# Patient Record
Sex: MALE | Race: WHITE | NOT HISPANIC OR LATINO
[De-identification: names, ages, dates, MRNs, and addresses within clinical notes are randomized per-mention and may not be internally consistent; named-entity substitution may affect disease eponyms.]

---

## 2018-10-05 ENCOUNTER — TRANSCRIPTION ENCOUNTER (OUTPATIENT)
Age: 43
End: 2018-10-05

## 2022-09-20 PROBLEM — Z00.00 ENCOUNTER FOR PREVENTIVE HEALTH EXAMINATION: Status: ACTIVE | Noted: 2022-09-20

## 2022-09-22 ENCOUNTER — APPOINTMENT (OUTPATIENT)
Dept: ORTHOPEDIC SURGERY | Facility: CLINIC | Age: 47
End: 2022-09-22

## 2022-09-22 VITALS — HEIGHT: 74 IN | BODY MASS INDEX: 25.67 KG/M2 | WEIGHT: 200 LBS

## 2022-09-22 DIAGNOSIS — S86.012A STRAIN OF LEFT ACHILLES TENDON, INITIAL ENCOUNTER: ICD-10-CM

## 2022-09-22 PROCEDURE — 99203 OFFICE O/P NEW LOW 30 MIN: CPT

## 2022-09-22 PROCEDURE — L4361: CPT | Mod: LT

## 2022-09-22 NOTE — DATA REVIEWED
[Ultrasound] : ultrasound [Left] : left [Lower Extremities] : lower extremities [Report was reviewed and noted in the chart] : The report was reviewed and noted in the chart [FreeTextEntry1] : partial achilles tear

## 2022-09-22 NOTE — HISTORY OF PRESENT ILLNESS
[de-identified] : 09/22/2022:  Achilles tendon injury playing basketball  in July 2022.  was told had achilles tear and placed in cast for 2.5 months in [place]. just had cast removed and returned and is looking to start PT. no prior ankle probs. no dm/tob. self employed - marketing.  [FreeTextEntry1] : left achilles

## 2022-09-22 NOTE — PHYSICAL EXAM
[Left] : left foot and ankle [Mild] : mild diffused ankle swelling [5___] : [facility] 5[unfilled]/5 [2+] : dorsalis pedis pulse: 2+ [] : Sensation present to light touch in all distributions [de-identified] : abnormal woodward [de-identified] : using i walk [de-identified] : plantar flexion 20 degrees [TWNoteComboBox7] : dorsiflexion 5 degrees

## 2022-10-27 ENCOUNTER — APPOINTMENT (OUTPATIENT)
Dept: ORTHOPEDIC SURGERY | Facility: CLINIC | Age: 47
End: 2022-10-27

## 2022-10-27 PROCEDURE — L4350: CPT | Mod: LT

## 2022-10-27 PROCEDURE — 99213 OFFICE O/P EST LOW 20 MIN: CPT | Mod: 25

## 2022-10-27 NOTE — PHYSICAL EXAM
[Left] : left foot and ankle [2+] : dorsalis pedis pulse: 2+ [5___] : plantar flexion 5[unfilled]/5 [] : patient ambulates without assistive device [de-identified] : abnormal woodward [de-identified] : using i walk [de-identified] : plantar flexion 30 degrees [TWNoteComboBox7] : dorsiflexion 10 degrees

## 2022-10-27 NOTE — ASSESSMENT
[FreeTextEntry1] : wbat  -- transition to airsport\par PT\par nsaids prn\par f/up 6 wks\par no jumping/cutting
Statement Selected

## 2022-10-27 NOTE — HISTORY OF PRESENT ILLNESS
[1] : 2 [0] : 0 [de-identified] : 09/22/2022:  Achilles tendon injury playing basketball  in July 2022.  was told had achilles tear and placed in cast for 2.5 months in [place]. just had cast removed and returned and is looking to start PT. no prior ankle probs. 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 going to PT. going w/o boot at times. no new injury [FreeTextEntry1] : left achilles

## 2022-12-07 ENCOUNTER — APPOINTMENT (OUTPATIENT)
Dept: ORTHOPEDIC SURGERY | Facility: CLINIC | Age: 47
End: 2022-12-07

## 2022-12-07 VITALS — WEIGHT: 200 LBS | HEIGHT: 74 IN | BODY MASS INDEX: 25.67 KG/M2

## 2022-12-07 DIAGNOSIS — M25.672 STIFFNESS OF LEFT ANKLE, NOT ELSEWHERE CLASSIFIED: ICD-10-CM

## 2022-12-07 PROCEDURE — L4397: CPT | Mod: LT

## 2022-12-07 PROCEDURE — 99213 OFFICE O/P EST LOW 20 MIN: CPT

## 2022-12-07 NOTE — HISTORY OF PRESENT ILLNESS
[0] : 0 [de-identified] : 09/22/2022:  Achilles tendon injury playing basketball  in July 2022.  was told had achilles tear and placed in cast for 2.5 months in [place]. just had cast removed and returned and is looking to start PT. no prior ankle probs. 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 going to PT. going w/o boot at times. no new injury.\par \par 12/07/2022: no sig pain. using brace. going to PT [] : Post Surgical Visit: no [FreeTextEntry1] : left achilles

## 2022-12-07 NOTE — PHYSICAL EXAM
[Left] : left foot and ankle [5___] : [facility] 5[unfilled]/5 [2+] : dorsalis pedis pulse: 2+ [] : no achilles tendon tenderness [NL (40)] : plantar flexion 40 degrees [de-identified] : abnormal woodward [de-identified] : plantar flexion 30 degrees [TWNoteComboBox7] : dorsiflexion 10 degrees

## 2023-02-08 ENCOUNTER — APPOINTMENT (OUTPATIENT)
Dept: ORTHOPEDIC SURGERY | Facility: CLINIC | Age: 48
End: 2023-02-08
Payer: MEDICAID

## 2023-02-08 VITALS — WEIGHT: 200 LBS | BODY MASS INDEX: 25.67 KG/M2 | HEIGHT: 74 IN

## 2023-02-08 PROCEDURE — 99213 OFFICE O/P EST LOW 20 MIN: CPT

## 2023-02-08 NOTE — HISTORY OF PRESENT ILLNESS
[0] : 0 [de-identified] : 09/22/2022:  Achilles tendon injury playing basketball  in July 2022.  was told had achilles tear and placed in cast for 2.5 months in [place]. just had cast removed and returned and is looking to start PT. no prior ankle probs. 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 going to PT. going w/o boot at times. no new injury.\par \par 12/07/2022: no sig pain. using brace. going to PT\par \par 02/08/2023:  no significant pain. going to PT. has returned to some bball activity [] : Post Surgical Visit: no [FreeTextEntry1] : left achilles

## 2023-02-08 NOTE — PHYSICAL EXAM
[Left] : left foot and ankle [NL (40)] : plantar flexion 40 degrees [2+] : dorsalis pedis pulse: 2+ [] : no achilles tendon tenderness [NL 30)] : inversion 30 degrees [NL (20)] : eversion 20 degrees [5___] : eversion 5[unfilled]/5 [de-identified] : abnormal woodward [TWNoteComboBox7] : dorsiflexion 15 degrees

## 2023-05-03 ENCOUNTER — APPOINTMENT (OUTPATIENT)
Dept: ORTHOPEDIC SURGERY | Facility: CLINIC | Age: 48
End: 2023-05-03

## 2023-05-11 ENCOUNTER — APPOINTMENT (OUTPATIENT)
Dept: ORTHOPEDIC SURGERY | Facility: CLINIC | Age: 48
End: 2023-05-11

## 2023-06-07 ENCOUNTER — APPOINTMENT (OUTPATIENT)
Dept: ORTHOPEDIC SURGERY | Facility: CLINIC | Age: 48
End: 2023-06-07
Payer: MEDICAID

## 2023-06-07 VITALS — BODY MASS INDEX: 25.67 KG/M2 | HEIGHT: 74 IN | WEIGHT: 200 LBS

## 2023-06-07 DIAGNOSIS — S86.012D STRAIN OF LEFT ACHILLES TENDON, SUBSEQUENT ENCOUNTER: ICD-10-CM

## 2023-06-07 PROCEDURE — 99213 OFFICE O/P EST LOW 20 MIN: CPT

## 2023-06-07 NOTE — PHYSICAL EXAM
[Left] : left foot and ankle [NL (40)] : plantar flexion 40 degrees [NL 30)] : inversion 30 degrees [NL (20)] : eversion 20 degrees [5___] : [facility] 5[unfilled]/5 [2+] : dorsalis pedis pulse: 2+ [] : no achilles tendon tenderness [de-identified] : abnormal woodward [TWNoteComboBox7] : dorsiflexion 15 degrees

## 2023-06-07 NOTE — HISTORY OF PRESENT ILLNESS
[0] : 0 [de-identified] : 09/22/2022:  Achilles tendon injury playing basketball  in July 2022.  was told had achilles tear and placed in cast for 2.5 months in [place]. just had cast removed and returned and is looking to start PT. no prior ankle probs. 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 going to PT. going w/o boot at times. no new injury.\par \par 12/07/2022: no sig pain. using brace. going to PT\par \par 02/08/2023:  no significant pain. going to PT. has returned to some bball activity\par \par 06/07/2023: no pain. doing light bball activity.. ran out of of PT visits. doing HEP [] : Post Surgical Visit: no [FreeTextEntry1] : left achilles

## 2024-06-25 ENCOUNTER — APPOINTMENT (OUTPATIENT)
Dept: ORTHOPEDIC SURGERY | Facility: CLINIC | Age: 49
End: 2024-06-25
Payer: SELF-PAY

## 2024-06-25 VITALS — HEIGHT: 74 IN | WEIGHT: 205 LBS | BODY MASS INDEX: 26.31 KG/M2

## 2024-06-25 DIAGNOSIS — M54.12 RADICULOPATHY, CERVICAL REGION: ICD-10-CM

## 2024-06-25 DIAGNOSIS — Z78.9 OTHER SPECIFIED HEALTH STATUS: ICD-10-CM

## 2024-06-25 PROCEDURE — 72040 X-RAY EXAM NECK SPINE 2-3 VW: CPT

## 2024-06-25 PROCEDURE — 73030 X-RAY EXAM OF SHOULDER: CPT | Mod: LT

## 2024-06-25 PROCEDURE — 99203 OFFICE O/P NEW LOW 30 MIN: CPT

## 2024-06-25 PROCEDURE — 73010 X-RAY EXAM OF SHOULDER BLADE: CPT | Mod: LT

## 2024-06-25 RX ORDER — MELOXICAM 7.5 MG/1
7.5 TABLET ORAL
Qty: 30 | Refills: 0 | Status: ACTIVE | COMMUNITY
Start: 2024-06-25 | End: 1900-01-01

## 2024-07-23 ENCOUNTER — APPOINTMENT (OUTPATIENT)
Dept: ORTHOPEDIC SURGERY | Facility: CLINIC | Age: 49
End: 2024-07-23
Payer: SELF-PAY

## 2024-07-23 DIAGNOSIS — M54.12 RADICULOPATHY, CERVICAL REGION: ICD-10-CM

## 2024-07-23 PROCEDURE — 99213 OFFICE O/P EST LOW 20 MIN: CPT

## 2024-07-23 NOTE — HISTORY OF PRESENT ILLNESS
[de-identified] : Date of Injury/Onset: June 11 2024 Pain: At Rest: 3 With Activity: 4 Mechanism of injury: lifting weights  This is NOT a Work Related Injury being treated under Worker's Compensation. This is NOT an athletic injury occurring associated with an interscholastic or organized sports team. Quality of symptoms: throbbing, tingling, stabbing Improves with: massages  Worse with: lifting, exercises  Prior treatment: n/a Prior Imaging: n/a Reports Available For Review Today: no Out of work/sport: working School/Sport/Position/Occupation: self-employee   06/25/2024: Pt is a 49 year M who presents today for evaluation of left shoulder. Patient reports injury while lifting weights about 2 weeks ago. Patient denies numbness however endorses some tingling, throbbing and stabbing pain. Patient states pain is radiating down his left arm. Patient states pain is worse with lifting or exercises. Patient states massages helps with pain relief.  07/23/2024 THEOPHULUS 49 year M is her today to follow up on left shoulder. Patient took the mobic for 8 days then self d/c'd. He has not yet started PT, states he is doing home exercises; swimming. Patient notices some mild improvement in pain since last visit, though still endorses intermittent tingling down left arm.

## 2024-07-23 NOTE — IMAGING
[de-identified] : NECK:  Inspection: no ecchymosis.   Palpation: trapezial tenderness.   Range of motion:  Full range of motion with mild stiffness . Pain at extremes of rotation to left.   Strength Testing: Weakness with Left Finger Abductors and Grasp  Normal Deltoid, Biceps, Triceps, Wrist Flexors  Neurological testing: light touch is intact throughout both upper extremities  Goncalves reflex: neg  Spurling test: positive

## 2024-07-23 NOTE — DATA REVIEWED
[FreeTextEntry1] :  Left X-Ray Examination of the SHOULDER (2 views):  no fractures, subluxations or dislocations.   X-Ray Examination of the SCAPULA 1 or 2 views shows: no significant abnormalities  X-Ray Examination of the CERVICAL SPINE 2 views (or less) shows: C5-c6 DDD

## 2024-07-23 NOTE — DISCUSSION/SUMMARY
[de-identified] : - We discussed their diagnosis and treatment options at length including the risks and benefits of both surgical and non-surgical options.  - We will conservative treatment with a course of PT and anti-inflammatory medication.  - Rx given for Medrol dose pack  - Discussed the possible side effects of medication along with the timing and frequency for taking.  - If they do not make an appropriate improvement with therapy we discussed that we will obtain and MRI at their next visit. *** Cervical radiculopathy Improving with HEP and mobic, was travelling for work and couldnt go to formal PT new PT rx provided to focus on cervical mobilization, nerve gliding and scapula/shoulder rom and strengthening RTC 5-6 weeks  next visit: if no improvement consider MR C spine and referral to pain v spine/EMG

## 2024-09-04 ENCOUNTER — APPOINTMENT (OUTPATIENT)
Dept: ORTHOPEDIC SURGERY | Facility: CLINIC | Age: 49
End: 2024-09-04